# Patient Record
Sex: FEMALE
[De-identification: names, ages, dates, MRNs, and addresses within clinical notes are randomized per-mention and may not be internally consistent; named-entity substitution may affect disease eponyms.]

---

## 2023-06-28 PROBLEM — Z00.00 ENCOUNTER FOR PREVENTIVE HEALTH EXAMINATION: Status: ACTIVE | Noted: 2023-06-28

## 2023-07-12 ENCOUNTER — APPOINTMENT (OUTPATIENT)
Dept: BARIATRICS | Facility: CLINIC | Age: 33
End: 2023-07-12
Payer: MEDICAID

## 2023-07-12 VITALS
DIASTOLIC BLOOD PRESSURE: 73 MMHG | BODY MASS INDEX: 32.9 KG/M2 | SYSTOLIC BLOOD PRESSURE: 110 MMHG | OXYGEN SATURATION: 98 % | HEART RATE: 74 BPM | HEIGHT: 63.5 IN | TEMPERATURE: 98.1 F | WEIGHT: 188 LBS

## 2023-07-12 DIAGNOSIS — E66.9 OBESITY, UNSPECIFIED: ICD-10-CM

## 2023-07-12 PROCEDURE — 99203 OFFICE O/P NEW LOW 30 MIN: CPT

## 2023-07-12 NOTE — ADDENDUM
[FreeTextEntry1] : Documented by Nickolas Duarte acting as a scribe for NILE Montgomery on 07/12/2023\par

## 2023-07-12 NOTE — PLAN
[FreeTextEntry1] : Consult with PCP for medical weight loss. Return for follow up if GLP analog fails to provide weight loss.

## 2023-07-12 NOTE — HISTORY OF PRESENT ILLNESS
[de-identified] :  is a 32 year old F with obesity, BMI of 32, probable fatty liver, insulin resistance, h/o depression and family history of diabetes. States she has been unable to lose weight with diet and exercise. She was on Ozempic for 6 months and discontinued with the lack of refill. After which, she gained more weight as her appetite increased on discontinuation. She is on Icosapent Ethyl. States she was about 120 lbs about 3 years ago. Probable fatty liver as her triglycerides are elevated in the lab she presented with. At this time, with a BMI of 32, the best option for weight loss for the patient is medical weight loss with GLP analog. Suggest patient consult with her PCP to start GLP analog. If GLP analog fails to provide weight loss, follow back in 6 months to discuss any possible surgical intervention.

## 2023-07-12 NOTE — ASSESSMENT
[FreeTextEntry1] :  is a 32 year old F with obesity, BMI of 32, probable fatty liver, insulin resistance, h/o depression and family history of diabetes. States she has been unable to lose weight with diet and exercise. She was on Ozempic for 6 months and discontinued with the lack of refill. After which, she gained more weight as her appetite increased on discontinuation. She is on Icosapent Ethyl. States she was about 120 lbs about 3 years ago. Probable fatty liver as her triglycerides are elevated in the lab she presented with. At this time, with a BMI of 32, the best option for weight loss for the patient is medical weight loss with GLP analog. Suggest patient consult with her PCP to start GLP analog. If GLP analog fails to provide weight loss, follow back in 6 months to discuss any possible surgical intervention.

## 2023-07-12 NOTE — END OF VISIT
[FreeTextEntry3] : All medical record entries made by the Scribe were at my, NILE Montgomery , direction and personally dictated by me on 07/12/2023 . I have reviewed the chart and agree that the record accurately reflects my personal performance of the history, physical exam, assessment and plan. I have also personally directed, reviewed, and agreed with the chart.\par

## 2023-07-17 PROBLEM — E66.9 OBESITY (BMI 30-39.9): Status: ACTIVE | Noted: 2023-07-17
